# Patient Record
Sex: MALE | Race: WHITE | HISPANIC OR LATINO | URBAN - METROPOLITAN AREA
[De-identification: names, ages, dates, MRNs, and addresses within clinical notes are randomized per-mention and may not be internally consistent; named-entity substitution may affect disease eponyms.]

---

## 2024-06-26 ENCOUNTER — APPOINTMENT (RX ONLY)
Dept: URBAN - METROPOLITAN AREA CLINIC 21 | Facility: CLINIC | Age: 39
Setting detail: DERMATOLOGY
End: 2024-06-26

## 2024-06-26 DIAGNOSIS — L73.2 HIDRADENITIS SUPPURATIVA: ICD-10-CM

## 2024-06-26 PROCEDURE — ? COUNSELING

## 2024-06-26 PROCEDURE — ? PRESCRIPTION MEDICATION MANAGEMENT

## 2024-06-26 PROCEDURE — ? PRESCRIPTION

## 2024-06-26 PROCEDURE — 99204 OFFICE O/P NEW MOD 45 MIN: CPT

## 2024-06-26 RX ORDER — DOXYCYCLINE HYCLATE 100 MG/1
CAPSULE, GELATIN COATED ORAL
Qty: 60 | Refills: 0 | Status: ERX | COMMUNITY
Start: 2024-06-26

## 2024-06-26 RX ORDER — CLINDAMYCIN PHOSPHATE 10 MG/ML
LOTION TOPICAL QD
Qty: 60 | Refills: 3 | Status: ERX | COMMUNITY
Start: 2024-06-26

## 2024-06-26 RX ADMIN — DOXYCYCLINE HYCLATE: 100 CAPSULE, GELATIN COATED ORAL at 00:00

## 2024-06-26 RX ADMIN — CLINDAMYCIN PHOSPHATE: 10 LOTION TOPICAL at 00:00

## 2024-06-26 ASSESSMENT — LOCATION SIMPLE DESCRIPTION DERM
LOCATION SIMPLE: RIGHT AXILLARY VAULT
LOCATION SIMPLE: POSTERIOR NECK

## 2024-06-26 ASSESSMENT — LOCATION ZONE DERM
LOCATION ZONE: AXILLAE
LOCATION ZONE: NECK

## 2024-06-26 ASSESSMENT — LOCATION DETAILED DESCRIPTION DERM
LOCATION DETAILED: MID POSTERIOR NECK
LOCATION DETAILED: RIGHT AXILLARY VAULT

## 2024-06-26 NOTE — PROCEDURE: PRESCRIPTION MEDICATION MANAGEMENT
Initiate Treatment: Doxycycline 100mg take 1 pill po bid with food and water. May cause sun sensitivity. Do not lie down for 1 hour after taking.\\nClindamycin 1% lotion apply to aa once daily.\\nHibiclens wash once daily in the shower.
Render In Strict Bullet Format?: No
Detail Level: Zone

## 2024-07-24 ENCOUNTER — APPOINTMENT (RX ONLY)
Dept: URBAN - METROPOLITAN AREA CLINIC 21 | Facility: CLINIC | Age: 39
Setting detail: DERMATOLOGY
End: 2024-07-24

## 2024-07-24 DIAGNOSIS — L73.2 HIDRADENITIS SUPPURATIVA: ICD-10-CM | Status: IMPROVED

## 2024-07-24 PROCEDURE — ? COUNSELING

## 2024-07-24 PROCEDURE — ? ADDITIONAL NOTES

## 2024-07-24 PROCEDURE — ? PRESCRIPTION MEDICATION MANAGEMENT

## 2024-07-24 PROCEDURE — 99214 OFFICE O/P EST MOD 30 MIN: CPT

## 2024-07-24 PROCEDURE — ? PRESCRIPTION

## 2024-07-24 RX ORDER — DOXYCYCLINE HYCLATE 100 MG/1
CAPSULE, GELATIN COATED ORAL
Qty: 60 | Refills: 0 | Status: ERX

## 2024-07-24 ASSESSMENT — LOCATION DETAILED DESCRIPTION DERM
LOCATION DETAILED: MID POSTERIOR NECK
LOCATION DETAILED: RIGHT AXILLARY VAULT

## 2024-07-24 ASSESSMENT — LOCATION ZONE DERM
LOCATION ZONE: AXILLAE
LOCATION ZONE: NECK

## 2024-07-24 ASSESSMENT — LOCATION SIMPLE DESCRIPTION DERM
LOCATION SIMPLE: RIGHT AXILLARY VAULT
LOCATION SIMPLE: POSTERIOR NECK

## 2024-07-24 NOTE — PROCEDURE: PRESCRIPTION MEDICATION MANAGEMENT
Continue Regimen: Doxycycline 100mg take 1 pill po bid with food and water. May cause sun sensitivity. Do not lie down for 1 hour after taking.\\nClindamycin 1% lotion apply to aa once daily.\\nHibiclens wash once daily in the shower.
Plan: Improved on exam.\\nPatient is self pay and cannot get cosentyx or humira.\\nRecheck in 4 weeks.
Render In Strict Bullet Format?: No
Detail Level: Zone

## 2024-07-24 NOTE — PROCEDURE: ADDITIONAL NOTES
Additional Notes:  used during today’s visit. (Mahad Collazo [id number])
Render Risk Assessment In Note?: no
Detail Level: Simple

## 2025-06-05 ENCOUNTER — APPOINTMENT (OUTPATIENT)
Dept: URBAN - METROPOLITAN AREA CLINIC 21 | Facility: CLINIC | Age: 40
Setting detail: DERMATOLOGY
End: 2025-06-05

## 2025-06-05 DIAGNOSIS — L73.2 HIDRADENITIS SUPPURATIVA: ICD-10-CM

## 2025-06-05 PROCEDURE — ? PRESCRIPTION MEDICATION MANAGEMENT

## 2025-06-05 PROCEDURE — ? PRESCRIPTION

## 2025-06-05 PROCEDURE — ? COUNSELING

## 2025-06-05 RX ORDER — CLINDAMYCIN PHOSPHATE 10 MG/ML
LOTION TOPICAL QD
Qty: 60 | Refills: 3 | Status: ERX

## 2025-06-05 RX ORDER — DOXYCYCLINE HYCLATE 100 MG/1
CAPSULE, GELATIN COATED ORAL
Qty: 60 | Refills: 1 | Status: ERX

## 2025-06-05 RX ORDER — CLINDAMYCIN PHOSPHATE 10 MG/ML
LOTION TOPICAL QD
Qty: 60 | Refills: 3 | Status: CANCELLED
Stop reason: SDUPTHER

## 2025-06-05 ASSESSMENT — LOCATION ZONE DERM: LOCATION ZONE: AXILLAE

## 2025-06-05 ASSESSMENT — LOCATION SIMPLE DESCRIPTION DERM: LOCATION SIMPLE: RIGHT AXILLARY VAULT

## 2025-06-05 ASSESSMENT — LOCATION DETAILED DESCRIPTION DERM: LOCATION DETAILED: RIGHT AXILLARY VAULT

## 2025-06-05 NOTE — PROCEDURE: PRESCRIPTION MEDICATION MANAGEMENT
Initiate Treatment: Doxycycline 100mg take 1 pill po bid with food and water. May cause sun sensitivity. Do not lie down for 1 hour after taking.\\nClindamycin 1% lotion apply to aa once daily.
Render In Strict Bullet Format?: No
Detail Level: Zone